# Patient Record
Sex: FEMALE | Race: WHITE | NOT HISPANIC OR LATINO | URBAN - NONMETROPOLITAN AREA
[De-identification: names, ages, dates, MRNs, and addresses within clinical notes are randomized per-mention and may not be internally consistent; named-entity substitution may affect disease eponyms.]

---

## 2024-03-06 PROBLEM — I45.89 CHRONOTROPIC INCOMPETENCE: Status: ACTIVE | Noted: 2024-03-06

## 2024-03-06 PROBLEM — E78.1 HYPERTRIGLYCERIDEMIA: Status: ACTIVE | Noted: 2024-03-06

## 2024-03-06 PROBLEM — R07.9 CHEST PAIN: Status: ACTIVE | Noted: 2024-03-06

## 2024-03-06 PROBLEM — K21.9 CHRONIC GERD: Status: ACTIVE | Noted: 2024-03-06

## 2024-03-06 PROBLEM — I34.1 MITRAL VALVE PROLAPSE: Status: ACTIVE | Noted: 2024-03-06

## 2024-03-06 PROBLEM — M53.9 MULTILEVEL DEGENERATIVE DISC DISEASE: Status: ACTIVE | Noted: 2024-03-06

## 2024-03-06 PROBLEM — Z95.1 HISTORY OF CORONARY ARTERY BYPASS GRAFT: Status: ACTIVE | Noted: 2024-03-06

## 2024-03-06 PROBLEM — M54.9 BACK PAIN, CHRONIC: Status: ACTIVE | Noted: 2024-03-06

## 2024-03-06 PROBLEM — I10 HYPERTENSION: Status: ACTIVE | Noted: 2024-03-06

## 2024-03-06 PROBLEM — M19.90 OSTEOARTHRITIS: Status: ACTIVE | Noted: 2024-03-06

## 2024-03-06 PROBLEM — F41.9 ANXIETY: Status: ACTIVE | Noted: 2024-03-06

## 2024-03-06 PROBLEM — I25.10 CAD (CORONARY ARTERY DISEASE): Status: ACTIVE | Noted: 2024-03-06

## 2024-03-06 PROBLEM — E78.5 HYPERLIPIDEMIA: Status: ACTIVE | Noted: 2024-03-06

## 2024-03-06 PROBLEM — E03.9 HYPOTHYROIDISM: Status: ACTIVE | Noted: 2024-03-06

## 2024-03-06 PROBLEM — I25.2 HISTORY OF MI (MYOCARDIAL INFARCTION): Status: ACTIVE | Noted: 2024-03-06

## 2024-03-06 PROBLEM — R94.39 ABNORMAL STRESS TEST: Status: ACTIVE | Noted: 2024-03-06

## 2024-03-06 PROBLEM — I48.0 PAROXYSMAL ATRIAL FIBRILLATION (MULTI): Status: ACTIVE | Noted: 2024-03-06

## 2024-03-06 PROBLEM — G89.29 BACK PAIN, CHRONIC: Status: ACTIVE | Noted: 2024-03-06

## 2024-03-06 RX ORDER — NITROGLYCERIN 0.4 MG/1
0.4 TABLET SUBLINGUAL EVERY 5 MIN PRN
COMMUNITY

## 2024-03-06 RX ORDER — ATORVASTATIN CALCIUM 40 MG/1
1 TABLET, FILM COATED ORAL NIGHTLY
COMMUNITY

## 2024-03-06 RX ORDER — GABAPENTIN 300 MG/1
900 CAPSULE ORAL 3 TIMES DAILY
COMMUNITY

## 2024-03-06 RX ORDER — LOSARTAN POTASSIUM 25 MG/1
1 TABLET ORAL DAILY
COMMUNITY

## 2024-03-06 RX ORDER — LEVOCETIRIZINE DIHYDROCHLORIDE 5 MG/1
1 TABLET ORAL DAILY
COMMUNITY

## 2024-03-06 RX ORDER — MULTIVITAMIN WITH IRON
1 TABLET ORAL DAILY
COMMUNITY

## 2024-03-06 RX ORDER — FLUOXETINE HYDROCHLORIDE 40 MG/1
1 CAPSULE ORAL DAILY
COMMUNITY

## 2024-03-06 RX ORDER — TRAZODONE HYDROCHLORIDE 100 MG/1
1 TABLET ORAL NIGHTLY
COMMUNITY

## 2024-03-06 RX ORDER — MONTELUKAST SODIUM 10 MG/1
1 TABLET, FILM COATED ORAL NIGHTLY
COMMUNITY

## 2024-03-06 RX ORDER — PNV NO.95/FERROUS FUM/FOLIC AC 28MG-0.8MG
1 TABLET ORAL DAILY
COMMUNITY

## 2024-03-06 RX ORDER — FLUTICASONE FUROATE, UMECLIDINIUM BROMIDE AND VILANTEROL TRIFENATATE 200; 62.5; 25 UG/1; UG/1; UG/1
1 POWDER RESPIRATORY (INHALATION) DAILY
COMMUNITY

## 2024-03-06 RX ORDER — CHOLECALCIFEROL (VITAMIN D3) 50 MCG
1 TABLET ORAL DAILY
COMMUNITY

## 2024-03-06 RX ORDER — ASPIRIN 81 MG/1
1 TABLET ORAL DAILY
COMMUNITY
Start: 2019-01-07

## 2024-03-06 RX ORDER — METOPROLOL TARTRATE 25 MG/1
0.5 TABLET, FILM COATED ORAL 2 TIMES DAILY
COMMUNITY

## 2024-03-06 RX ORDER — ESOMEPRAZOLE MAGNESIUM 40 MG/1
40 CAPSULE, DELAYED RELEASE ORAL
COMMUNITY

## 2024-03-06 RX ORDER — ALPRAZOLAM 0.5 MG/1
1 TABLET ORAL 3 TIMES DAILY PRN
COMMUNITY

## 2024-03-06 RX ORDER — EZETIMIBE 10 MG/1
1 TABLET ORAL NIGHTLY
COMMUNITY

## 2024-03-06 RX ORDER — LEVOTHYROXINE SODIUM 25 UG/1
1 TABLET ORAL DAILY
COMMUNITY

## 2024-07-29 ENCOUNTER — APPOINTMENT (OUTPATIENT)
Dept: CARDIOLOGY | Facility: CLINIC | Age: 74
End: 2024-07-29
Payer: MEDICARE

## 2024-07-29 VITALS
HEIGHT: 62 IN | BODY MASS INDEX: 34.78 KG/M2 | HEART RATE: 60 BPM | SYSTOLIC BLOOD PRESSURE: 110 MMHG | WEIGHT: 189 LBS | DIASTOLIC BLOOD PRESSURE: 84 MMHG

## 2024-07-29 DIAGNOSIS — Z71.2 ENCOUNTER TO DISCUSS TEST RESULTS: ICD-10-CM

## 2024-07-29 DIAGNOSIS — I10 HYPERTENSION, UNSPECIFIED TYPE: ICD-10-CM

## 2024-07-29 DIAGNOSIS — I25.10 CORONARY ARTERY DISEASE INVOLVING NATIVE CORONARY ARTERY OF NATIVE HEART, UNSPECIFIED WHETHER ANGINA PRESENT: ICD-10-CM

## 2024-07-29 DIAGNOSIS — E78.5 HYPERLIPIDEMIA, UNSPECIFIED HYPERLIPIDEMIA TYPE: ICD-10-CM

## 2024-07-29 DIAGNOSIS — Z95.1 HISTORY OF CORONARY ARTERY BYPASS GRAFT: ICD-10-CM

## 2024-07-29 DIAGNOSIS — I25.2 HISTORY OF MI (MYOCARDIAL INFARCTION): ICD-10-CM

## 2024-07-29 DIAGNOSIS — E78.1 HYPERTRIGLYCERIDEMIA: ICD-10-CM

## 2024-07-29 DIAGNOSIS — I48.0 PAROXYSMAL ATRIAL FIBRILLATION (MULTI): ICD-10-CM

## 2024-07-29 PROBLEM — Z87.891 FORMER CIGARETTE SMOKER: Status: ACTIVE | Noted: 2024-07-29

## 2024-07-29 PROCEDURE — 1159F MED LIST DOCD IN RCRD: CPT | Performed by: INTERNAL MEDICINE

## 2024-07-29 PROCEDURE — 3079F DIAST BP 80-89 MM HG: CPT | Performed by: INTERNAL MEDICINE

## 2024-07-29 PROCEDURE — 1160F RVW MEDS BY RX/DR IN RCRD: CPT | Performed by: INTERNAL MEDICINE

## 2024-07-29 PROCEDURE — 3008F BODY MASS INDEX DOCD: CPT | Performed by: INTERNAL MEDICINE

## 2024-07-29 PROCEDURE — 99214 OFFICE O/P EST MOD 30 MIN: CPT | Performed by: INTERNAL MEDICINE

## 2024-07-29 PROCEDURE — 1036F TOBACCO NON-USER: CPT | Performed by: INTERNAL MEDICINE

## 2024-07-29 PROCEDURE — 3074F SYST BP LT 130 MM HG: CPT | Performed by: INTERNAL MEDICINE

## 2024-07-29 RX ORDER — ICOSAPENT ETHYL 1000 MG/1
2 CAPSULE ORAL
Qty: 360 CAPSULE | Refills: 3 | Status: SHIPPED | OUTPATIENT
Start: 2024-07-29 | End: 2025-07-29

## 2024-07-29 RX ORDER — AZELASTINE 1 MG/ML
1 SPRAY, METERED NASAL 2 TIMES DAILY
COMMUNITY

## 2024-07-29 RX ORDER — FLUTICASONE FUROATE AND VILANTEROL 100; 25 UG/1; UG/1
1 POWDER RESPIRATORY (INHALATION) DAILY
COMMUNITY

## 2024-07-29 RX ORDER — GUAIFENESIN 600 MG/1
1200 TABLET, EXTENDED RELEASE ORAL AS NEEDED
COMMUNITY

## 2024-07-29 NOTE — PATIENT INSTRUCTIONS
Please bring all medicines, vitamins, and herbal supplements with you when you come to the office.    Prescriptions will not be filled unless you are compliant with your follow up appointments or have a follow up appointment scheduled as per instruction of your physician. Refills should be requested at the time of your visit.     BMI was above normal measurement. Current weight: 85.7 kg (189 lb)  Weight change since last visit (-) denotes wt loss 0 lbs   Weight loss needed to achieve BMI 25: 52.6 Lbs  Weight loss needed to achieve BMI 30: 25.3 Lbs  Provided instructions on dietary changes  Provided instructions on exercise.

## 2024-07-29 NOTE — PROGRESS NOTES
"1 year FU.    Subjective :     Interval review of systems is negative for chest discomfort pressure tightness heaviness palpitations lightheadedness orthopnea paroxysmal nocturnal dyspnea dependent edema or claudication TIA or CVA type symptoms or bleeding diathesis    His discomfort has resolved  History so Far :  1. Atherosclerotic native vessel coronary artery disease with prior coronary artery bypass grafting. Cardiac catheterization December 2019 :20% left main 90% proximal LAD, long lesion, 50% RCA stenosis, 90% diffuse circumflex stenosis. Patient underwent LIMA to high diagonal and LAD, JOSELYN to obtuse marginal 1, SVG to PDA.  2. Preserved LV systolic function  3. Hypertension-at target with tendency for orthostatic hypotension  4. Interscapular discomfort-reminds her of her precoronary artery bypass graft symptoms in some ways  5. Hypertriglyceridemia  6.Tendency for orthostatic hypotension   7. Intolerance to several medications including several antihypertensive agents and statins.  8. Frequent stress test 2/20/2023-7 METS, 80% HPJ maximum heart rate, blunted heart rate response to exercise due to medications, did not have hypotension with exercise. No diagnostic ST-T abnormalities, but inconclusive because target heart rate was not achieved.  9.  Lexiscan Myoview August 2023-normal perfusion LVEF 54% transient ischemic dilatation 1.21 which is normal    Objective   Wt Readings from Last 3 Encounters:   07/29/24 85.7 kg (189 lb)   07/27/23 85.7 kg (189 lb)   06/15/23 85.3 kg (188 lb)            Vitals:    07/29/24 1438   BP: 110/84   BP Location: Left arm   Patient Position: Sitting   Pulse: 60   Weight: 85.7 kg (189 lb)   Height: 1.575 m (5' 2\")                Physical Exam:    GENERAL APPEARANCE: in no acute distress.  CHEST: Symmetric and non-tender.  Well-healed anterior sternotomy scar of open heart surgery is noted  INTEGUMENT: Skin warm and dry  HEENT: No gross abnormalities identified.No pallor or " scleral icterus.  NECK: Supple, no JVD, no bruit.   NEURO/PSHCY: Alert and oriented x3; appropriate behavior and responses and responses  LUNGS: Clear to auscultation bilaterally; normal respiratory effort.  HEART: Rate and rhythm regular with no evident murmur; no gallop appreciated.   ABDOMEN: Soft, non tender.  MUSCULOSKELETAL: No gross deformities.  EXTREMITIES: Warm  There is no edema noted.    Meds:  Current Outpatient Medications   Medication Instructions    ALPRAZolam (Xanax) 0.5 mg tablet 1 tablet, oral, 3 times daily PRN    aspirin 81 mg EC tablet 1 tablet, oral, Daily    atorvastatin (Lipitor) 40 mg tablet 1 tablet, oral, Nightly    azelastine (Astelin) 137 mcg (0.1 %) nasal spray 1 spray, Each Nostril, 2 times daily, Use in each nostril as directed    calcium carbonate/vitamin D3 (CALTRATE 600 PLUS D ORAL) 1 tablet, oral, 2 times daily    ezetimibe (Zetia) 10 mg tablet 1 tablet, oral, Nightly    FLUoxetine (PROzac) 40 mg capsule 1 capsule, oral, Daily    fluticasone furoate-vilanteroL (Breo Ellipta) 100-25 mcg/dose inhaler 1 puff, inhalation, Daily    gabapentin (NEURONTIN) 900 mg, oral, 3 times daily    guaiFENesin (MUCINEX) 1,200 mg, oral, As needed, Do not crush, chew, or split.    L. acidophilus/Bifid. animalis 32 billion cell capsule 1 capsule, oral, Daily    levothyroxine (Synthroid, Levoxyl) 25 mcg tablet 1 tablet, oral, Daily    losartan (Cozaar) 25 mg tablet 1 tablet, oral, Daily    metoprolol tartrate (Lopressor) 25 mg tablet 0.5 tablets, oral, 2 times daily    NexIUM 40 mg, oral, Daily before breakfast    nitroglycerin (NITROSTAT) 0.4 mg, sublingual, Every 5 min PRN    Singulair 10 mg tablet 1 tablet, oral, Nightly    traZODone (Desyrel) 100 mg tablet 1 tablet, oral, Nightly    Vascepa 2 g, oral, 2 times daily (morning and late afternoon)    Xyzal 5 mg tablet 1 tablet, oral, Daily          Allergies   Allergen Reactions    Amlodipine Swelling    Amoxicillin Diarrhea    Azithromycin Other     Ciprofloxacin GI Upset    Clonidine Hcl Other    Codeine Nausea/vomiting    Doxazosin Other    Gemfibrozil Myalgia    Hydralazine Other    Hydrochlorothiazide Other    Iodinated Contrast Media Headache    Lisinopril Angioedema    Nifedipine Itching and Swelling    Nitroglycerin Nausea Only and Nausea/vomiting    Olmesartan Diarrhea    Pravastatin Myalgia    Simvastatin Myalgia    Spironolactone Headache    Statins-Hmg-Coa Reductase Inhibitors Myalgia    Atorvastatin Rash    Fenofibrate Rash    Minoxidil Palpitations    Rizatriptan Palpitations    Sulfa (Sulfonamide Antibiotics) Rash    Sumatriptan Palpitations     Laboratory data July 2024 to include CBC, basic metabolic profile liver enzymes and lipid profile were reviewed.  CBC basic metabolic profile are within normal limits lipid profile is notable for triglycerides of 321, patient reports that she has persistently elevated triglycerides, total cholesterol 165 LDL 54 HDL 47 Free T40.6 TSH 1.730 currently using generic fish oil.        LABS:    Lab Results   Component Value Date    WBC 10.2 01/07/2019    HGB 7.4 (L) 01/07/2019    HCT 24.1 (L) 01/07/2019     01/07/2019    CHOL 228 (H) 12/29/2018    TRIG 149 12/29/2018    HDL 40.8 12/29/2018    ALT 14 12/29/2018    AST 10 12/29/2018     01/07/2019    K 3.5 01/07/2019     01/07/2019    CREATININE 0.55 01/07/2019    BUN 11 01/07/2019    CO2 24 01/07/2019    TSH 5.25 (H) 12/29/2018    INR 1.1 01/07/2019    HGBA1C 5.4 08/17/2021                       Patient Active Problem List    Diagnosis Date Noted    Former cigarette smoker 07/29/2024    Encounter to discuss test results 07/29/2024    Anxiety 03/06/2024    Back pain, chronic 03/06/2024    CAD (coronary artery disease) 03/06/2024    Chronic GERD 03/06/2024    History of coronary artery bypass graft 03/06/2024    History of MI (myocardial infarction) 03/06/2024    Hyperlipidemia 03/06/2024    Hypertension 03/06/2024    Hypothyroidism 03/06/2024     Mitral valve prolapse 03/06/2024    Multilevel degenerative disc disease 03/06/2024    Osteoarthritis 03/06/2024    Paroxysmal atrial fibrillation (Multi) 03/06/2024    Abnormal stress test 03/06/2024    Chest pain 03/06/2024    Chronotropic incompetence 03/06/2024    Hypertriglyceridemia 03/06/2024                 Assessment:    1. Coronary artery disease involving native coronary artery of native heart, unspecified whether angina present  Follow Up In Cardiology      2. History of coronary artery bypass graft        3. History of MI (myocardial infarction)        4. Hyperlipidemia, unspecified hyperlipidemia type  Comprehensive Metabolic Panel    Lipid Panel    Comprehensive Metabolic Panel    Lipid Panel      5. Hypertension, unspecified type        6. Paroxysmal atrial fibrillation (Multi)        7. Encounter to discuss test results        8. Hypertriglyceridemia  Vascepa 1 gram capsule    Comprehensive Metabolic Panel    Lipid Panel    Comprehensive Metabolic Panel    Lipid Panel         Patient with atherosclerotic native vessel coronary artery disease and history of coronary artery bypass grafting, multiple risk factors to include hypertension increased BMI mixed dyslipidemia hypertriglyceridemia, has intolerance to several antihypertensive agents and statins, tolerating atorvastatin and Zetia combination, was on fish oil, we will switch to Vascepa 2 g p.o. twice daily.  Follow up : 1 year      Comprehensive profile and lipid profile prior to next visit  Provider Attestation - Scribe documentation    All medical record entries made by the Scribe were at my direction and personally dictated by me. I have reviewed the chart and agree that the record accurately reflects my personal performance of the history, physical exam, discussion and plan.

## 2024-08-12 ENCOUNTER — TELEPHONE (OUTPATIENT)
Dept: CARDIOLOGY | Facility: CLINIC | Age: 74
End: 2024-08-12
Payer: MEDICARE

## 2024-08-12 DIAGNOSIS — E78.5 HYPERLIPIDEMIA, UNSPECIFIED HYPERLIPIDEMIA TYPE: ICD-10-CM

## 2024-08-12 RX ORDER — ICOSAPENT ETHYL 1 G/1
2 CAPSULE ORAL
Qty: 360 CAPSULE | Refills: 3 | Status: SHIPPED | OUTPATIENT
Start: 2024-08-12 | End: 2025-08-12

## 2024-08-12 NOTE — TELEPHONE ENCOUNTER
Insurance will only cover generic Vascepa. Would like to change to Icosapent. Please advise.    To Dr. Sunni Weller MD

## 2024-08-21 NOTE — TELEPHONE ENCOUNTER
Received fax from [employer]. States approval to change from Vascepa to Icosapent Ethyl 1 gram, 2 capsules twice daily.

## 2025-08-04 ENCOUNTER — APPOINTMENT (OUTPATIENT)
Dept: CARDIOLOGY | Facility: CLINIC | Age: 75
End: 2025-08-04
Payer: MEDICARE

## 2025-08-04 VITALS
BODY MASS INDEX: 33.13 KG/M2 | HEIGHT: 62 IN | DIASTOLIC BLOOD PRESSURE: 84 MMHG | WEIGHT: 180 LBS | SYSTOLIC BLOOD PRESSURE: 132 MMHG | HEART RATE: 68 BPM

## 2025-08-04 DIAGNOSIS — E78.2 MIXED HYPERLIPIDEMIA: ICD-10-CM

## 2025-08-04 DIAGNOSIS — I25.810 CORONARY ARTERY DISEASE INVOLVING CORONARY BYPASS GRAFT OF NATIVE HEART WITHOUT ANGINA PECTORIS: ICD-10-CM

## 2025-08-04 DIAGNOSIS — Z87.891 FORMER CIGARETTE SMOKER: ICD-10-CM

## 2025-08-04 DIAGNOSIS — I48.0 PAROXYSMAL ATRIAL FIBRILLATION (MULTI): ICD-10-CM

## 2025-08-04 DIAGNOSIS — I10 PRIMARY HYPERTENSION: ICD-10-CM

## 2025-08-04 PROCEDURE — 3079F DIAST BP 80-89 MM HG: CPT | Performed by: INTERNAL MEDICINE

## 2025-08-04 PROCEDURE — 3075F SYST BP GE 130 - 139MM HG: CPT | Performed by: INTERNAL MEDICINE

## 2025-08-04 PROCEDURE — 1159F MED LIST DOCD IN RCRD: CPT | Performed by: INTERNAL MEDICINE

## 2025-08-04 PROCEDURE — 3008F BODY MASS INDEX DOCD: CPT | Performed by: INTERNAL MEDICINE

## 2025-08-04 PROCEDURE — 1160F RVW MEDS BY RX/DR IN RCRD: CPT | Performed by: INTERNAL MEDICINE

## 2025-08-04 PROCEDURE — 99214 OFFICE O/P EST MOD 30 MIN: CPT | Performed by: INTERNAL MEDICINE

## 2025-08-04 RX ORDER — SEMAGLUTIDE 0.5 MG/0.1
0.5 SYRINGE (ML) SUBCUTANEOUS
COMMUNITY

## 2025-08-04 NOTE — PROGRESS NOTES
Chief Complaint:    Chief Complaint   Patient presents with    Annual Exam     1 year, coronary artery disease       Subjective : Patient with atherosclerotic disease involving autoLog us bypass graft, multiple cardiac risk factors to include primary hypertension mixed hyperlipidemia particularly hypertriglyceridemia, paroxysmal atrial fibrillation on high risk medications, presents for 1 year follow-up.  Also has tendency for severe orthostatic hypotension.    Review of Systems interval review of systems is negative for chest discomfort pressure tightness heaviness palpitations lightheadedness orthopnea paroxysmal nocturnal dyspnea dependent edema or claudication TIA or CVA type symptoms or bleeding diathesis    On GLP-1, started a month ago.  No diabetes  12 point review of systems is negative or noncontributory except as noted    History so Far :    1. Atherosclerotic native vessel coronary artery disease with prior coronary artery bypass grafting. Cardiac catheterization December 2019 :20% left main 90% proximal LAD, long lesion, 50% RCA stenosis, 90% diffuse circumflex stenosis. Patient underwent LIMA to high diagonal and LAD, JOSELYN to obtuse marginal 1, SVG to PDA.  2. Preserved LV systolic function  3. Hypertension-at target with tendency for orthostatic hypotension  4. Interscapular discomfort-reminds her of her precoronary artery bypass graft symptoms in some ways  5. Hypertriglyceridemia  6.Tendency for orthostatic hypotension   7. Intolerance to several medications including several antihypertensive agents and statins.  8. Frequent stress test 2/20/2023-7 METS, 80% HPJ maximum heart rate, blunted heart rate response to exercise due to medications, did not have hypotension with exercise. No diagnostic ST-T abnormalities, but inconclusive because target heart rate was not achieved.  9.  Lexiscan Myoview August 2023-normal perfusion LVEF 54% transient ischemic dilatation 1.21 which is normal    Objective  "  Wt Readings from Last 3 Encounters:   08/04/25 81.6 kg (180 lb)   07/29/24 85.7 kg (189 lb)   07/27/23 85.7 kg (189 lb)        Vitals:    08/04/25 1000   BP: 132/84   BP Location: Left arm   Patient Position: Sitting   Pulse: 68   Weight: 81.6 kg (180 lb)   Height: 1.575 m (5' 2\")           Physical Exam:    GENERAL APPEARANCE: in no acute distress.  CHEST: Symmetric and non-tender.  Well-healed anterior sternotomy scar of prior coronary artery bypass grafting is noted.  INTEGUMENT: Skin warm and dry  HEENT: No gross abnormalities identified.No pallor or scleral icterus.  NECK: Supple, no JVD, no bruit.   NEURO/PSHCY: Alert and oriented x3; appropriate behavior and responses and responses  LUNGS: Clear to auscultation bilaterally; normal respiratory effort.  HEART: Rate and rhythm regular with no evident murmur; no gallop appreciated.   ABDOMEN: Soft, non tender.  MUSCULOSKELETAL: No gross deformities.  EXTREMITIES: Warm  There is no edema noted.    Meds:  Current Outpatient Medications   Medication Instructions    ALPRAZolam (Xanax) 0.5 mg tablet 1 tablet, 3 times daily PRN    aspirin 81 mg EC tablet 1 tablet, Daily    atorvastatin (Lipitor) 40 mg tablet 1 tablet, Nightly    azelastine (Astelin) 137 mcg (0.1 %) nasal spray 1 spray, 2 times daily    calcium carbonate/vitamin D3 (CALTRATE 600 PLUS D ORAL) 1 tablet, 2 times daily    ezetimibe (Zetia) 10 mg tablet 1 tablet, Nightly    FLUoxetine (PROzac) 40 mg capsule 1 capsule, Daily    fluticasone furoate-vilanteroL (Breo Ellipta) 100-25 mcg/dose inhaler 1 puff, Daily    gabapentin (NEURONTIN) 900 mg, 3 times daily    guaiFENesin (MUCINEX) 1,200 mg, As needed    icosapent ethyL (VASCEPA) 2 g, oral, 2 times daily (morning and late afternoon)    L. acidophilus/Bifid. animalis 32 billion cell capsule 1 capsule, Daily    levothyroxine (Synthroid, Levoxyl) 25 mcg tablet 1 tablet, Daily    losartan (Cozaar) 25 mg tablet 1 tablet, Daily    metoprolol tartrate (Lopressor) " 25 mg tablet 0.5 tablets, 2 times daily    NexIUM 40 mg, Daily before breakfast    nitroglycerin (NITROSTAT) 0.4 mg, Every 5 min PRN    semaglutide 0.5 mg, Once Weekly    Singulair 10 mg tablet 1 tablet, Nightly    traZODone (Desyrel) 100 mg tablet 1 tablet, Nightly    Xyzal 5 mg tablet 1 tablet, Daily        Allergies:  Amlodipine, Amoxicillin, Azithromycin, Ciprofloxacin, Clonidine hcl, Codeine, Doxazosin, Gemfibrozil, Hydralazine, Hydrochlorothiazide, Iodinated contrast media, Lisinopril, Nifedipine, Nitroglycerin, Olmesartan, Pravastatin, Simvastatin, Spironolactone, Statins-hmg-coa reductase inhibitors, Atorvastatin, Fenofibrate, Minoxidil, Rizatriptan, Sulfa (sulfonamide antibiotics), and Sumatriptan      LABS:      Testing Reviewed  Labs    CBC:   Lab Results   Component Value Date    WBC 10.2 01/07/2019    RBC 2.46 (L) 01/07/2019    HGB 7.4 (L) 01/07/2019    HCT 24.1 (L) 01/07/2019     01/07/2019        CMP:    Lab Results   Component Value Date     01/07/2019    K 3.5 01/07/2019     01/07/2019    CO2 24 01/07/2019    BUN 11 01/07/2019    CREATININE 0.55 01/07/2019    GLUCOSE 84 01/07/2019    CALCIUM 8.4 (L) 01/07/2019       Lipid Profile:    Lab Results   Component Value Date    CHOL 228 (H) 12/29/2018    TRIG 149 12/29/2018    HDL 40.8 12/29/2018       HgBA1c:    Lab Results   Component Value Date    HGBA1C 5.4 08/17/2021       Magnesium:    Lab Results   Component Value Date    MG 2.05 01/07/2019       TSH:    Lab Results   Component Value Date    TSH 5.25 (H) 12/29/2018 July 2025-hemoglobin 14.5 hematocrit 42.9 platelets 1 86,000 sodium 137 potassium 4 BUN 20 creatinine 0.73 GFR greater than 60 liver enzymes normal total cholesterol 137 triglycerides 221 LDL 54 HDL 39 total cholesterol to HDL ratio 3.5 Free T40.81 TSH 4.15    Reviewed all available pertinent laboratory data and diagnostic testing results that occurred after the last office visit with me                 Assessment:    1. Coronary artery disease involving coronary bypass graft of native heart without angina pectoris  Follow Up In Cardiology      2. History of MI (myocardial infarction)        3. Mixed hyperlipidemia        4. Primary hypertension        5. Paroxysmal atrial fibrillation (Multi)        6. Former cigarette smoker        7. BMI 32.0-32.9,adult             Clinical Decision Making: Remains on antiplatelet therapy.  No recent atrial fibrillation recurrences.  Blood pressure is at target.  Lipid profile is at target.  She is working on weight loss.  From CAD perspective she is class I.    Follow up : 1 year  No change in cardiac medications.  Comprehensive profile and lipid profile prior to next visit.              IAshleigh RN am scribing for, and in the presence of Dr. Sunni Weller MD, FACC.       I, Dr. Sunni Weller MD, FACC, personally performed the services described in the documentation as scribed by Ashleigh OROSCO RN in my presence, and confirm it is both accurate and complete.

## 2025-08-04 NOTE — LETTER
August 4, 2025     Smitha Pagan DO  [address]  [address] [facility] [address]    Patient: Columba Velasco   YOB: 1950   Date of Visit: 8/4/2025       Dear Dr. Smitha Pagan DO:    Thank you for referring Columba Velasco to me for evaluation. Below are my notes for this consultation.  If you have questions, please do not hesitate to call me. I look forward to following your patient along with you.       Sincerely,     Sunni Weller MD      CC: No Recipients  ______________________________________________________________________________________        Chief Complaint:    Chief Complaint   Patient presents with   • Annual Exam     1 year, coronary artery disease       Subjective : Patient with atherosclerotic disease involving autoLog us bypass graft, multiple cardiac risk factors to include primary hypertension mixed hyperlipidemia particularly hypertriglyceridemia, paroxysmal atrial fibrillation on high risk medications, presents for 1 year follow-up.  Also has tendency for severe orthostatic hypotension.    Review of Systems interval review of systems is negative for chest discomfort pressure tightness heaviness palpitations lightheadedness orthopnea paroxysmal nocturnal dyspnea dependent edema or claudication TIA or CVA type symptoms or bleeding diathesis    On GLP-1, started a month ago.  No diabetes  12 point review of systems is negative or noncontributory except as noted    History so Far :    1. Atherosclerotic native vessel coronary artery disease with prior coronary artery bypass grafting. Cardiac catheterization December 2019 :20% left main 90% proximal LAD, long lesion, 50% RCA stenosis, 90% diffuse circumflex stenosis. Patient underwent LIMA to high diagonal and LAD, JOSELYN to obtuse marginal 1, SVG to PDA.  2. Preserved LV systolic function  3. Hypertension-at target with tendency for orthostatic hypotension  4. Interscapular discomfort-reminds her of her precoronary artery  "bypass graft symptoms in some ways  5. Hypertriglyceridemia  6.Tendency for orthostatic hypotension   7. Intolerance to several medications including several antihypertensive agents and statins.  8. Frequent stress test 2/20/2023-7 METS, 80% HPJ maximum heart rate, blunted heart rate response to exercise due to medications, did not have hypotension with exercise. No diagnostic ST-T abnormalities, but inconclusive because target heart rate was not achieved.  9.  Lexiscan Myoview August 2023-normal perfusion LVEF 54% transient ischemic dilatation 1.21 which is normal    Objective   Wt Readings from Last 3 Encounters:   08/04/25 81.6 kg (180 lb)   07/29/24 85.7 kg (189 lb)   07/27/23 85.7 kg (189 lb)        Vitals:    08/04/25 1000   BP: 132/84   BP Location: Left arm   Patient Position: Sitting   Pulse: 68   Weight: 81.6 kg (180 lb)   Height: 1.575 m (5' 2\")           Physical Exam:    GENERAL APPEARANCE: in no acute distress.  CHEST: Symmetric and non-tender.  Well-healed anterior sternotomy scar of prior coronary artery bypass grafting is noted.  INTEGUMENT: Skin warm and dry  HEENT: No gross abnormalities identified.No pallor or scleral icterus.  NECK: Supple, no JVD, no bruit.   NEURO/PSHCY: Alert and oriented x3; appropriate behavior and responses and responses  LUNGS: Clear to auscultation bilaterally; normal respiratory effort.  HEART: Rate and rhythm regular with no evident murmur; no gallop appreciated.   ABDOMEN: Soft, non tender.  MUSCULOSKELETAL: No gross deformities.  EXTREMITIES: Warm  There is no edema noted.    Meds:  Current Outpatient Medications   Medication Instructions   • ALPRAZolam (Xanax) 0.5 mg tablet 1 tablet, 3 times daily PRN   • aspirin 81 mg EC tablet 1 tablet, Daily   • atorvastatin (Lipitor) 40 mg tablet 1 tablet, Nightly   • azelastine (Astelin) 137 mcg (0.1 %) nasal spray 1 spray, 2 times daily   • calcium carbonate/vitamin D3 (CALTRATE 600 PLUS D ORAL) 1 tablet, 2 times daily   • " ezetimibe (Zetia) 10 mg tablet 1 tablet, Nightly   • FLUoxetine (PROzac) 40 mg capsule 1 capsule, Daily   • fluticasone furoate-vilanteroL (Breo Ellipta) 100-25 mcg/dose inhaler 1 puff, Daily   • gabapentin (NEURONTIN) 900 mg, 3 times daily   • guaiFENesin (MUCINEX) 1,200 mg, As needed   • icosapent ethyL (VASCEPA) 2 g, oral, 2 times daily (morning and late afternoon)   • L. acidophilus/Bifid. animalis 32 billion cell capsule 1 capsule, Daily   • levothyroxine (Synthroid, Levoxyl) 25 mcg tablet 1 tablet, Daily   • losartan (Cozaar) 25 mg tablet 1 tablet, Daily   • metoprolol tartrate (Lopressor) 25 mg tablet 0.5 tablets, 2 times daily   • NexIUM 40 mg, Daily before breakfast   • nitroglycerin (NITROSTAT) 0.4 mg, Every 5 min PRN   • semaglutide 0.5 mg, Once Weekly   • Singulair 10 mg tablet 1 tablet, Nightly   • traZODone (Desyrel) 100 mg tablet 1 tablet, Nightly   • Xyzal 5 mg tablet 1 tablet, Daily        Allergies:  Amlodipine, Amoxicillin, Azithromycin, Ciprofloxacin, Clonidine hcl, Codeine, Doxazosin, Gemfibrozil, Hydralazine, Hydrochlorothiazide, Iodinated contrast media, Lisinopril, Nifedipine, Nitroglycerin, Olmesartan, Pravastatin, Simvastatin, Spironolactone, Statins-hmg-coa reductase inhibitors, Atorvastatin, Fenofibrate, Minoxidil, Rizatriptan, Sulfa (sulfonamide antibiotics), and Sumatriptan      LABS:      Testing Reviewed  Labs    CBC:   Lab Results   Component Value Date    WBC 10.2 01/07/2019    RBC 2.46 (L) 01/07/2019    HGB 7.4 (L) 01/07/2019    HCT 24.1 (L) 01/07/2019     01/07/2019        CMP:    Lab Results   Component Value Date     01/07/2019    K 3.5 01/07/2019     01/07/2019    CO2 24 01/07/2019    BUN 11 01/07/2019    CREATININE 0.55 01/07/2019    GLUCOSE 84 01/07/2019    CALCIUM 8.4 (L) 01/07/2019       Lipid Profile:    Lab Results   Component Value Date    CHOL 228 (H) 12/29/2018    TRIG 149 12/29/2018    HDL 40.8 12/29/2018       HgBA1c:    Lab Results   Component  Value Date    HGBA1C 5.4 08/17/2021       Magnesium:    Lab Results   Component Value Date    MG 2.05 01/07/2019       TSH:    Lab Results   Component Value Date    TSH 5.25 (H) 12/29/2018 July 2025-hemoglobin 14.5 hematocrit 42.9 platelets 1 86,000 sodium 137 potassium 4 BUN 20 creatinine 0.73 GFR greater than 60 liver enzymes normal total cholesterol 137 triglycerides 221 LDL 54 HDL 39 total cholesterol to HDL ratio 3.5 Free T40.81 TSH 4.15    Reviewed all available pertinent laboratory data and diagnostic testing results that occurred after the last office visit with me                Assessment:    1. Coronary artery disease involving coronary bypass graft of native heart without angina pectoris  Follow Up In Cardiology      2. History of MI (myocardial infarction)        3. Mixed hyperlipidemia        4. Primary hypertension        5. Paroxysmal atrial fibrillation (Multi)        6. Former cigarette smoker        7. BMI 32.0-32.9,adult             Clinical Decision Making: Remains on antiplatelet therapy.  No recent atrial fibrillation recurrences.  Blood pressure is at target.  Lipid profile is at target.  She is working on weight loss.  From CAD perspective she is class I.    Follow up : 1 year  No change in cardiac medications.  Comprehensive profile and lipid profile prior to next visit.              IAshleigh RN am scribing for, and in the presence of Dr. Sunni Weller MD, FACC.       I, Dr. Sunni Weller MD, FACC, personally performed the services described in the documentation as scribed by Ashleigh OROSCO RN in my presence, and confirm it is both accurate and complete.

## 2025-08-04 NOTE — PATIENT INSTRUCTIONS
Please bring all medicines, vitamins, and herbal supplements with you when you come to the office.    Prescriptions will not be filled unless you are compliant with your follow up appointments or have a follow up appointment scheduled as per instruction of your physician. Refills should be requested at the time of your visit.     BMI was above normal measurement. Current weight: 81.6 kg (180 lb)  Weight change since last visit (-) denotes wt loss -9 lbs   Weight loss needed to achieve BMI 25: 43.6 Lbs  Weight loss needed to achieve BMI 30: 16.3 Lbs  Provided instructions on dietary changes  Provided instructions on exercise.

## 2026-08-06 ENCOUNTER — APPOINTMENT (OUTPATIENT)
Dept: CARDIOLOGY | Facility: CLINIC | Age: 76
End: 2026-08-06
Payer: MEDICARE